# Patient Record
Sex: FEMALE | Race: WHITE | NOT HISPANIC OR LATINO
[De-identification: names, ages, dates, MRNs, and addresses within clinical notes are randomized per-mention and may not be internally consistent; named-entity substitution may affect disease eponyms.]

---

## 2019-01-11 PROBLEM — Z00.00 ENCOUNTER FOR PREVENTIVE HEALTH EXAMINATION: Status: ACTIVE | Noted: 2019-01-11

## 2019-01-14 ENCOUNTER — APPOINTMENT (OUTPATIENT)
Dept: ENDOCRINOLOGY | Facility: CLINIC | Age: 39
End: 2019-01-14
Payer: MEDICAID

## 2019-01-14 VITALS
WEIGHT: 185 LBS | DIASTOLIC BLOOD PRESSURE: 90 MMHG | SYSTOLIC BLOOD PRESSURE: 140 MMHG | BODY MASS INDEX: 29.73 KG/M2 | HEART RATE: 120 BPM | HEIGHT: 66 IN

## 2019-01-14 PROCEDURE — 99205 OFFICE O/P NEW HI 60 MIN: CPT

## 2019-01-14 RX ORDER — UBIDECARENONE 10 MG
10 CAPSULE ORAL
Refills: 0 | Status: ACTIVE | COMMUNITY

## 2019-01-14 RX ORDER — MAGNESIUM OXIDE 500 MG
500 TABLET ORAL
Refills: 0 | Status: ACTIVE | COMMUNITY

## 2019-01-14 NOTE — ASSESSMENT
[FreeTextEntry1] : 1) Hypothyroidism w/ hx of PTC:\par Appears clinically euthyroid at this time on 150 mcg of LT4 (not taking med appropriately, advised to wait at least 45 min prior to having brk). Reassess TFTs and need for medication titration at this time. Reviewed importance of compliance and to alert us if plans for pregnancy change in order to adjust dose and increase monitoring. Target TBD pending repeat TGB (was undetectable in 9/2018) and US of the neck. We will consider LT3 initiation.\par \par 1) Obesity, Morbid: Class I,. High risk of metabolic syndrome and future complications. Discussed options including meds, bariatric surgery and lifestyle modification. RB and alternatives discussed. Questions answered and she verbalized understanding. Refer to nutrition and start hypocaloric, hypocarb diet in addition to exercise regimen. Refer to  now. If no 5-7% weight loss observed on f/u, will consider med initiation.\par \par \par \par  [Carbohydrate Consistent Diet] : carbohydrate consistent diet [Long Term Vascular Complications] : long term vascular complications of diabetes [Importance of Diet and Exercise] : importance of diet and exercise to improve glycemic control, achieve weight loss and improve cardiovascular health [Levothyroxine] : The patient was instructed to take Levothyroxine on an empty stomach, separate from vitamins, and wait at least 30 minutes before eating

## 2019-01-14 NOTE — PHYSICAL EXAM
[Alert] : alert [No Acute Distress] : no acute distress [Well Nourished] : well nourished [Well Developed] : well developed [Normal Sclera/Conjunctiva] : normal sclera/conjunctiva [EOMI] : extra ocular movement intact [No Proptosis] : no proptosis [Normal Oropharynx] : the oropharynx was normal [No Neck Mass] : no neck mass was observed [No LAD] : no lymphadenopathy [Well Healed Scar] : well healed scar [No Respiratory Distress] : no respiratory distress [No Accessory Muscle Use] : no accessory muscle use [Clear to Auscultation] : lungs were clear to auscultation bilaterally [Normal Rate] : heart rate was normal  [Normal S1, S2] : normal S1 and S2 [Regular Rhythm] : with a regular rhythm [Pedal Pulses Normal] : the pedal pulses are present [No Edema] : there was no peripheral edema [Normal Bowel Sounds] : normal bowel sounds [Not Tender] : non-tender [Soft] : abdomen soft [Not Distended] : not distended [Post Cervical Nodes] : posterior cervical nodes [Anterior Cervical Nodes] : anterior cervical nodes [Axillary Nodes] : axillary nodes [Normal] : normal and non tender [No Spinal Tenderness] : no spinal tenderness [Spine Straight] : spine straight [No Stigmata of Cushings Syndrome] : no stigmata of cushings syndrome [Normal Gait] : normal gait [Normal Strength/Tone] : muscle strength and tone were normal [No Rash] : no rash [Normal Reflexes] : deep tendon reflexes were 2+ and symmetric [No Tremors] : no tremors [Oriented x3] : oriented to person, place, and time [Acanthosis Nigricans] : no acanthosis nigricans

## 2019-01-14 NOTE — HISTORY OF PRESENT ILLNESS
[FreeTextEntry1] : 37 y/o F\par Here for initial thyroid evaluation.\par Reports remote hx of hyperthyroidism, dx at age 10. Had PORRAS treatment back then with partial resolution of symptoms. \par Hyperthyroidism recurred in 2012, prompting re-evaluation. She reports having elective TT. Path revealed PTC and she had PORRAS treatment in 2013. All of this management was performed in the DR. \par Since, she reports compliance w/ LT4, was chronically on 137 mcg till 9/2018, when dose was increased to 150 mcg. She is taking it 15-30 min before breakfast and w/o other pills, only water. TSH continues above 8 as of 1/2019. fT4 is high normal at 1.6, Tt3 is low normal at 90. All other labs wnl aside from mildly increased TP and Albumin. ROS + for fatigue, body aches and difficulty with weight loss, which are chronic.\par She otherwise denies any f/c, CP, SOB, palpitations, tremors, depressed mood, anxiety, palpitations, n/v, stool/urinary abn, skin changes, heat/cold intolerance, HAs, breast/nipple changes, polyuria/polydipsia/nocturia or other complaints.\par

## 2019-01-14 NOTE — PAST MEDICAL HISTORY
[Menstruating] : The patient is menstruating [History of Hormone Replacement Treatment] : has a history of hormone replacement treatment [Regular Cycle Intervals] : have been regular

## 2019-05-07 ENCOUNTER — RX RENEWAL (OUTPATIENT)
Age: 39
End: 2019-05-07

## 2019-05-13 ENCOUNTER — APPOINTMENT (OUTPATIENT)
Dept: ENDOCRINOLOGY | Facility: CLINIC | Age: 39
End: 2019-05-13
Payer: MEDICAID

## 2019-05-13 VITALS
BODY MASS INDEX: 29.19 KG/M2 | HEART RATE: 68 BPM | DIASTOLIC BLOOD PRESSURE: 80 MMHG | SYSTOLIC BLOOD PRESSURE: 120 MMHG | HEIGHT: 67 IN | WEIGHT: 186 LBS

## 2019-05-13 PROCEDURE — 99215 OFFICE O/P EST HI 40 MIN: CPT

## 2019-05-13 NOTE — HISTORY OF PRESENT ILLNESS
[FreeTextEntry1] : 39 y/o F\par Here for initial thyroid evaluation.\par Reports remote hx of hyperthyroidism, dx at age 10. Had PORRAS treatment back then with partial resolution of symptoms. \par Hyperthyroidism recurred in 2012, prompting re-evaluation. She reports having elective TT. Path revealed PTC and she had PORRAS treatment in 2013. All of this management was performed in the DR. \par Since, she reports compliance w/ LT4, was chronically on 137 mcg till 9/2018, when dose was increased to 150 mcg. She is taking it 15-30 min before breakfast and w/o other pills, only water. TSH continues above 8 as of 1/2019. fT4 is high normal at 1.6, Tt3 is low normal at 90. All other labs wnl aside from mildly increased TP and Albumin. ROS + for fatigue, body aches and difficulty with weight loss, which are chronic.\par \par 5/2019: Here for /fu, generally feels well and endorses no acute complaints. No interval events since LV. Today comes to f/u labs. Notes ongoing fatigue and difficuly w/ weight loss but again admits to sedentary lifestyle and anxiety driven binge eating.\par She otherwise denies any f/c, CP, SOB, palpitations, tremors, depressed mood, anxiety, palpitations, n/v, stool/urinary abn, skin changes, heat/cold intolerance, HAs, breast/nipple changes, polyuria/polydipsia/nocturia or other complaints.\par

## 2019-05-13 NOTE — ASSESSMENT
[FreeTextEntry1] : 1) Hypothyroidism w/ hx of PTC:\par Appears clinically euthyroid at this time on 150 mcg of LT4 (not taking med appropriately, advised to wait at least 45 min prior to having brk). Reassess TFTs and need for medication titration at this time. Reviewed importance of compliance and to alert us if plans for pregnancy change in order to adjust dose and increase monitoring. Target TBD pending repeat TGB (was undetectable in 9/2018) and US of the neck. We will consider LT3 initiation.\par \par 1) Obesity, Morbid: Class I,. High risk of metabolic syndrome and future complications. Discussed options including meds, bariatric surgery and lifestyle modification. RB and alternatives discussed. Questions answered and she verbalized understanding. Refer to nutrition and start hypocaloric, hypocarb diet in addition to exercise regimen. As anxiety driven binge eating is apparent, we will start  wellbutrin 150 mg QD and titrate to response as tolerated. If no 5-7% weight loss observed on f/u, will consider metformin initiation.\par \par \par \par  [Carbohydrate Consistent Diet] : carbohydrate consistent diet [Long Term Vascular Complications] : long term vascular complications of diabetes [Importance of Diet and Exercise] : importance of diet and exercise to improve glycemic control, achieve weight loss and improve cardiovascular health [Levothyroxine] : The patient was instructed to take Levothyroxine on an empty stomach, separate from vitamins, and wait at least 30 minutes before eating [FreeTextEntry2] : 15 min independent obesity counseling provided

## 2019-05-13 NOTE — PHYSICAL EXAM
[Alert] : alert [No Acute Distress] : no acute distress [Well Nourished] : well nourished [Well Developed] : well developed [Normal Sclera/Conjunctiva] : normal sclera/conjunctiva [EOMI] : extra ocular movement intact [No Proptosis] : no proptosis [Normal Oropharynx] : the oropharynx was normal [No Neck Mass] : no neck mass was observed [No LAD] : no lymphadenopathy [Well Healed Scar] : well healed scar [No Respiratory Distress] : no respiratory distress [No Accessory Muscle Use] : no accessory muscle use [Clear to Auscultation] : lungs were clear to auscultation bilaterally [Normal Rate] : heart rate was normal  [Normal S1, S2] : normal S1 and S2 [Regular Rhythm] : with a regular rhythm [Pedal Pulses Normal] : the pedal pulses are present [No Edema] : there was no peripheral edema [Normal Bowel Sounds] : normal bowel sounds [Not Tender] : non-tender [Soft] : abdomen soft [Not Distended] : not distended [Post Cervical Nodes] : posterior cervical nodes [Anterior Cervical Nodes] : anterior cervical nodes [Axillary Nodes] : axillary nodes [Normal] : normal and non tender [No Spinal Tenderness] : no spinal tenderness [Spine Straight] : spine straight [No Stigmata of Cushings Syndrome] : no stigmata of cushings syndrome [Normal Gait] : normal gait [Normal Strength/Tone] : muscle strength and tone were normal [No Rash] : no rash [Acanthosis Nigricans] : no acanthosis nigricans [Normal Reflexes] : deep tendon reflexes were 2+ and symmetric [No Tremors] : no tremors [Oriented x3] : oriented to person, place, and time

## 2019-08-07 ENCOUNTER — RX RENEWAL (OUTPATIENT)
Age: 39
End: 2019-08-07

## 2019-08-26 ENCOUNTER — APPOINTMENT (OUTPATIENT)
Dept: ENDOCRINOLOGY | Facility: CLINIC | Age: 39
End: 2019-08-26
Payer: MEDICAID

## 2019-08-26 VITALS
HEART RATE: 71 BPM | HEIGHT: 67 IN | BODY MASS INDEX: 28.41 KG/M2 | DIASTOLIC BLOOD PRESSURE: 80 MMHG | SYSTOLIC BLOOD PRESSURE: 120 MMHG | WEIGHT: 181 LBS

## 2019-08-26 PROCEDURE — 99215 OFFICE O/P EST HI 40 MIN: CPT | Mod: 25

## 2019-08-26 PROCEDURE — G0447 BEHAVIOR COUNSEL OBESITY 15M: CPT

## 2019-08-26 NOTE — ASSESSMENT
[Carbohydrate Consistent Diet] : carbohydrate consistent diet [Long Term Vascular Complications] : long term vascular complications of diabetes [Importance of Diet and Exercise] : importance of diet and exercise to improve glycemic control, achieve weight loss and improve cardiovascular health [Levothyroxine] : The patient was instructed to take Levothyroxine on an empty stomach, separate from vitamins, and wait at least 30 minutes before eating [FreeTextEntry1] : 1) Hypothyroidism w/ hx of PTC:\par Appears clinically euthyroid at this time on 150 mcg of LT4 (not taking med appropriately, advised to wait at least 45 min prior to having brk). Reassess TFTs and need for medication titration at this time. Reviewed importance of compliance and to alert us if plans for pregnancy change in order to adjust dose and increase monitoring. Target TBD pending repeat TGB (was undetectable in 9/2018) and US of the neck. We will consider LT3 initiation.\par \par 1) Obesity, Morbid: Class I,. High risk of metabolic syndrome and future complications. Discussed options including meds, bariatric surgery and lifestyle modification. RB and alternatives discussed. Questions answered and she verbalized understanding. Refer to nutrition and start hypocaloric, hypocarb diet in addition to exercise regimen. As anxiety driven binge eating is apparent, we will start  wellbutrin 150 mg QD and titrate to response as tolerated. If no 5-7% weight loss observed on f/u, will proceed w/ metformin initiation.\par \par \par \par  [FreeTextEntry2] : 15 min independent obesity counseling provided

## 2019-08-26 NOTE — HISTORY OF PRESENT ILLNESS
[FreeTextEntry1] : 39 y/o F\par Here for initial thyroid evaluation.\par Reports remote hx of hyperthyroidism, dx at age 10. Had PORRAS treatment back then with partial resolution of symptoms. \par Hyperthyroidism recurred in 2012, prompting re-evaluation. She reports having elective TT. Path revealed PTC and she had PORRAS treatment in 2013. All of this management was performed in the DR. \par Since, she reports compliance w/ LT4, was chronically on 137 mcg till 9/2018, when dose was increased to 150 mcg. She is taking it 15-30 min before breakfast and w/o other pills, only water. TSH continues above 8 as of 1/2019. fT4 is high normal at 1.6, Tt3 is low normal at 90. All other labs wnl aside from mildly increased TP and Albumin. ROS + for fatigue, body aches and difficulty with weight loss, which are chronic.\par \par 8/2019: Here for /fu, generally feels well and endorses no acute complaints. No interval events since LV. Today comes to f/u labs. Notes resolved fatigue and has had more success w/ weight loss but again admits to sedentary lifestyle and anxiety driven binge eating. tolerated bupropion well.\par She otherwise denies any f/c, CP, SOB, palpitations, tremors, depressed mood, anxiety, palpitations, n/v, stool/urinary abn, skin changes, heat/cold intolerance, HAs, breast/nipple changes, polyuria/polydipsia/nocturia or other complaints.\par

## 2019-11-05 ENCOUNTER — RX RENEWAL (OUTPATIENT)
Age: 39
End: 2019-11-05

## 2020-02-03 ENCOUNTER — RX RENEWAL (OUTPATIENT)
Age: 40
End: 2020-02-03

## 2020-06-09 ENCOUNTER — RX RENEWAL (OUTPATIENT)
Age: 40
End: 2020-06-09

## 2020-06-14 ENCOUNTER — RX RENEWAL (OUTPATIENT)
Age: 40
End: 2020-06-14

## 2020-08-31 ENCOUNTER — APPOINTMENT (OUTPATIENT)
Dept: ENDOCRINOLOGY | Facility: CLINIC | Age: 40
End: 2020-08-31
Payer: MEDICAID

## 2020-08-31 VITALS
HEART RATE: 70 BPM | WEIGHT: 188 LBS | BODY MASS INDEX: 29.51 KG/M2 | DIASTOLIC BLOOD PRESSURE: 84 MMHG | HEIGHT: 67 IN | SYSTOLIC BLOOD PRESSURE: 118 MMHG

## 2020-08-31 PROCEDURE — 99215 OFFICE O/P EST HI 40 MIN: CPT

## 2020-08-31 RX ORDER — METFORMIN ER 500 MG 500 MG/1
500 TABLET ORAL DAILY
Qty: 90 | Refills: 3 | Status: COMPLETED | COMMUNITY
Start: 2019-08-26 | End: 2020-08-31

## 2020-08-31 NOTE — ASSESSMENT
[FreeTextEntry1] : 1) Hypothyroidism w/ hx of PTC:\par Appears clinically euthyroid at this time on 150 mcg of LT4 (not taking med appropriately, advised to wait at least 45 min prior to having brk). Reassess TFTs and need for medication titration at this time. Reviewed importance of compliance and to alert us if plans for pregnancy change in order to adjust dose and increase monitoring. Target TBD pending repeat TGB (was undetectable in 9/2018) and US of the neck. We will consider LT3 initiation.\par \par 1) Obesity, Morbid: Class I,. High risk of metabolic syndrome and future complications. Discussed options including meds, bariatric surgery and lifestyle modification. RB and alternatives discussed. Questions answered and she verbalized understanding. Refer to nutrition and start hypocaloric, hypocarb diet in addition to exercise regimen. As anxiety driven binge eating is apparent, we will start  wellbutrin 150 mg QD and titrate to response as tolerated. If no 5-7% weight loss observed on f/u, will proceed w/ metformin initiation.\par \par \par \par  [Carbohydrate Consistent Diet] : carbohydrate consistent diet [Long Term Vascular Complications] : long term vascular complications of diabetes [Importance of Diet and Exercise] : importance of diet and exercise to improve glycemic control, achieve weight loss and improve cardiovascular health [Levothyroxine] : The patient was instructed to take Levothyroxine on an empty stomach, separate from vitamins, and wait at least 30 minutes before eating [FreeTextEntry2] : 15 min independent obesity counseling provided

## 2020-08-31 NOTE — PHYSICAL EXAM
[Alert] : alert [No Acute Distress] : no acute distress [Well Nourished] : well nourished [Well Developed] : well developed [Normal Sclera/Conjunctiva] : normal sclera/conjunctiva [EOMI] : extra ocular movement intact [No Proptosis] : no proptosis [Normal Oropharynx] : the oropharynx was normal [Thyroid Not Enlarged] : the thyroid was not enlarged [No Thyroid Nodules] : no palpable thyroid nodules [No Respiratory Distress] : no respiratory distress [No Accessory Muscle Use] : no accessory muscle use [Clear to Auscultation] : lungs were clear to auscultation bilaterally [Normal S1, S2] : normal S1 and S2 [Normal Rate] : heart rate was normal [Regular Rhythm] : with a regular rhythm [No Edema] : no peripheral edema [Normal Bowel Sounds] : normal bowel sounds [Pedal Pulses Normal] : the pedal pulses are present [Not Distended] : not distended [Not Tender] : non-tender [Soft] : abdomen soft [Normal Anterior Cervical Nodes] : no anterior cervical lymphadenopathy [Normal Posterior Cervical Nodes] : no posterior cervical lymphadenopathy [No Spinal Tenderness] : no spinal tenderness [Spine Straight] : spine straight [No Stigmata of Cushings Syndrome] : no stigmata of Cushings Syndrome [Normal Gait] : normal gait [Normal Strength/Tone] : muscle strength and tone were normal [No Rash] : no rash [Acanthosis Nigricans] : no acanthosis nigricans [Normal Reflexes] : deep tendon reflexes were 2+ and symmetric [No Tremors] : no tremors [Oriented x3] : oriented to person, place, and time

## 2020-08-31 NOTE — HISTORY OF PRESENT ILLNESS
[FreeTextEntry1] : 40 y/o F\par Here for f/u thyroid evaluation.\par Reports remote hx of hyperthyroidism, dx at age 10. Had PORRAS treatment back then with partial resolution of symptoms. \par Hyperthyroidism recurred in 2012, prompting re-evaluation. She reports having elective TT. Path revealed PTC and she had PORRAS treatment in 2013. All of this management was performed in the DR. \par Since, she reports compliance w/ LT4, was chronically on 137 mcg till 9/2018, when dose was increased to 150 mcg. She is taking it 15-30 min before breakfast and w/o other pills, only water. TSH continues above 8 as of 1/2019. fT4 is high normal at 1.6, Tt3 is low normal at 90. All other labs wnl aside from mildly increased TP and Albumin. ROS + for fatigue, body aches and difficulty with weight loss, which are chronic.\par \par 8/2019: Here for /fu, generally feels well and endorses no acute complaints. No interval events since LV. Today comes to f/u labs. Notes resolved fatigue and has had more success w/ weight loss but again admits to sedentary lifestyle and anxiety driven binge eating. tolerated bupropion well.\par She otherwise denies any f/c, CP, SOB, palpitations, tremors, depressed mood, anxiety, palpitations, n/v, stool/urinary abn, skin changes, heat/cold intolerance, HAs, breast/nipple changes, polyuria/polydipsia/nocturia or other complaints.\par

## 2020-12-07 ENCOUNTER — APPOINTMENT (OUTPATIENT)
Dept: ENDOCRINOLOGY | Facility: CLINIC | Age: 40
End: 2020-12-07
Payer: MEDICAID

## 2020-12-07 VITALS
WEIGHT: 188 LBS | SYSTOLIC BLOOD PRESSURE: 120 MMHG | BODY MASS INDEX: 29.51 KG/M2 | DIASTOLIC BLOOD PRESSURE: 80 MMHG | HEART RATE: 71 BPM | HEIGHT: 67 IN

## 2020-12-07 PROCEDURE — 99072 ADDL SUPL MATRL&STAF TM PHE: CPT

## 2020-12-07 PROCEDURE — 99215 OFFICE O/P EST HI 40 MIN: CPT

## 2020-12-07 NOTE — ASSESSMENT
[Long Term Vascular Complications] : long term vascular complications of diabetes [Levothyroxine] : The patient was instructed to take Levothyroxine on an empty stomach, separate from vitamins, and wait at least 30 minutes before eating [FreeTextEntry1] : 1) Hypothyroidism w/ hx of PTC:\par Appears clinically euthyroid at this time on 150 mcg of LT4 (not taking med appropriately, advised to wait at least 45 min prior to having brk). Reassess TFTs and need for medication titration at this time. Reviewed importance of compliance and to alert us if plans for pregnancy change in order to adjust dose and increase monitoring. Target TBD pending repeat TGB (was undetectable in 9/2018) and US of the neck. We will consider LT3 initiation.\par reduce to 137 mcg as TGB remains undetectable as of 12/2020\par \par 1) Obesity, Morbid: Class I,. High risk of metabolic syndrome and future complications. Discussed options including meds, bariatric surgery and lifestyle modification. RB and alternatives discussed. Questions answered and she verbalized understanding. Refer to nutrition and start hypocaloric, hypocarb diet in addition to exercise regimen. As anxiety driven binge eating is apparent, we will start  wellbutrin 150 mg QD and titrate to response as tolerated. If no 5-7% weight loss observed on f/u, will proceed w/ metformin initiation. we will also start metformin for prediabetes status\par \par \par \par  [Carbohydrate Consistent Diet] : carbohydrate consistent diet [Importance of Diet and Exercise] : importance of diet and exercise to improve glycemic control, achieve weight loss and improve cardiovascular health [FreeTextEntry2] : 15 min independent obesity counseling provided

## 2020-12-07 NOTE — HISTORY OF PRESENT ILLNESS
[FreeTextEntry1] : 38 y/o F\par Here for f/u thyroid evaluation.\par Reports remote hx of hyperthyroidism, dx at age 10. Had PORRAS treatment back then with partial resolution of symptoms. \par Hyperthyroidism recurred in 2012, prompting re-evaluation. She reports having elective TT. Path revealed PTC and she had PORRAS treatment in 2013. All of this management was performed in the DR. \par Since, she reports compliance w/ LT4, was chronically on 137 mcg till 9/2018, when dose was increased to 150 mcg. She is taking it 15-30 min before breakfast and w/o other pills, only water. TSH continues above 8 as of 1/2019. fT4 is high normal at 1.6, Tt3 is low normal at 90. All other labs wnl aside from mildly increased TP and Albumin. ROS + for fatigue, body aches and difficulty with weight loss, which are chronic.\par \par 12/2020: Here for /fu, generally feels well and endorses no acute complaints. No interval events since LV. Today comes to f/u labs. Notes resolved fatigue and has had more success w/ weight loss but again admits to sedentary lifestyle and anxiety driven binge eating. tolerated bupropion well.\par She otherwise denies any f/c, CP, SOB, palpitations, tremors, depressed mood, anxiety, palpitations, n/v, stool/urinary abn, skin changes, heat/cold intolerance, HAs, breast/nipple changes, polyuria/polydipsia/nocturia or other complaints.\par

## 2021-03-16 ENCOUNTER — RX RENEWAL (OUTPATIENT)
Age: 41
End: 2021-03-16

## 2021-04-05 ENCOUNTER — APPOINTMENT (OUTPATIENT)
Dept: ENDOCRINOLOGY | Facility: CLINIC | Age: 41
End: 2021-04-05
Payer: MEDICAID

## 2021-04-05 VITALS
HEART RATE: 86 BPM | WEIGHT: 190 LBS | BODY MASS INDEX: 29.82 KG/M2 | SYSTOLIC BLOOD PRESSURE: 130 MMHG | DIASTOLIC BLOOD PRESSURE: 90 MMHG | HEIGHT: 67 IN

## 2021-04-05 PROCEDURE — 99214 OFFICE O/P EST MOD 30 MIN: CPT

## 2021-04-05 PROCEDURE — 99072 ADDL SUPL MATRL&STAF TM PHE: CPT

## 2021-04-05 RX ORDER — METFORMIN ER 750 MG 750 MG/1
750 TABLET ORAL DAILY
Qty: 90 | Refills: 0 | Status: COMPLETED | COMMUNITY
Start: 2020-12-07 | End: 2021-04-05

## 2021-04-05 NOTE — HISTORY OF PRESENT ILLNESS
[FreeTextEntry1] : 39 y/o F\par Here for f/u thyroid evaluation.\par Reports remote hx of hyperthyroidism, dx at age 10. Had PORRAS treatment back then with partial resolution of symptoms. \par Hyperthyroidism recurred in 2012, prompting re-evaluation. She reports having elective TT. Path revealed PTC and she had PORRAS treatment in 2013. All of this management was performed in the DR. \par Since, she reports compliance w/ LT4, was chronically on 137 mcg till 9/2018, when dose was increased to 150 mcg. She is taking it 15-30 min before breakfast and w/o other pills, only water. TSH continues above 8 as of 1/2019. fT4 is high normal at 1.6, Tt3 is low normal at 90. All other labs wnl aside from mildly increased TP and Albumin. ROS + for fatigue, body aches and difficulty with weight loss, which are chronic.\par \par 12/2020: Here for /fu, generally feels well and endorses no acute complaints. No interval events since LV. Today comes to f/u labs. Notes resolved fatigue and has had more success w/ weight loss but again admits to sedentary lifestyle and anxiety driven binge eating. tolerated bupropion well.\par She otherwise denies any f/c, CP, SOB, palpitations, tremors, depressed mood, anxiety, palpitations, n/v, stool/urinary abn, skin changes, heat/cold intolerance, HAs, breast/nipple changes, polyuria/polydipsia/nocturia or other complaints.\par

## 2021-04-05 NOTE — ASSESSMENT
[Carbohydrate Consistent Diet] : carbohydrate consistent diet [Long Term Vascular Complications] : long term vascular complications of diabetes [Importance of Diet and Exercise] : importance of diet and exercise to improve glycemic control, achieve weight loss and improve cardiovascular health [Levothyroxine] : The patient was instructed to take Levothyroxine on an empty stomach, separate from vitamins, and wait at least 30 minutes before eating [FreeTextEntry1] : 1) Hypothyroidism w/ hx of PTC:\par Appears clinically euthyroid at this time on 150 mcg of LT4 (not taking med appropriately, advised to wait at least 45 min prior to having brk). Reassess TFTs and need for medication titration at this time. Reviewed importance of compliance and to alert us if plans for pregnancy change in order to adjust dose and increase monitoring. Target TBD pending repeat TGB (was undetectable in 9/2018) and US of the neck. We will consider LT3 initiation.\par reduce to 137 mcg as TGB remains undetectable as of 12/2020\par \par 1) Obesity, Morbid: Class I,. High risk of metabolic syndrome and future complications. Discussed options including meds, bariatric surgery and lifestyle modification. RB and alternatives discussed. Questions answered and she verbalized understanding. Refer to nutrition and start hypocaloric, hypocarb diet in addition to exercise regimen. As anxiety driven binge eating is apparent, we will start  wellbutrin 150 mg QD and titrate to response as tolerated. If no 5-7% weight loss observed on f/u, will proceed w/ metformin initiation. we will also start metformin for prediabetes status\par \par \par \par  [FreeTextEntry2] : 15 min independent obesity counseling provided

## 2021-11-01 ENCOUNTER — APPOINTMENT (OUTPATIENT)
Dept: ENDOCRINOLOGY | Facility: CLINIC | Age: 41
End: 2021-11-01
Payer: MEDICAID

## 2021-11-01 VITALS
HEART RATE: 90 BPM | WEIGHT: 171 LBS | SYSTOLIC BLOOD PRESSURE: 130 MMHG | DIASTOLIC BLOOD PRESSURE: 80 MMHG | HEIGHT: 67 IN | BODY MASS INDEX: 26.84 KG/M2

## 2021-11-01 PROCEDURE — 99215 OFFICE O/P EST HI 40 MIN: CPT

## 2021-11-01 RX ORDER — BUPROPION HYDROCHLORIDE 300 MG/1
300 TABLET, EXTENDED RELEASE ORAL DAILY
Qty: 90 | Refills: 3 | Status: ACTIVE | COMMUNITY
Start: 2019-05-13 | End: 1900-01-01

## 2021-11-01 NOTE — HISTORY OF PRESENT ILLNESS
[FreeTextEntry1] : 42 y/o F\par Here for f/u thyroid evaluation.\par Reports remote hx of hyperthyroidism, dx at age 10. Had PORRAS treatment back then with partial resolution of symptoms. \par Hyperthyroidism recurred in 2012, prompting re-evaluation. She reports having elective TT. Path revealed PTC and she had PORRAS treatment in 2013. All of this management was performed in the [place]. \par Since, she reports compliance w/ LT4, was chronically on 137 mcg till 9/2018, when dose was increased to 150 mcg. She is taking it 15-30 min before breakfast and w/o other pills, only water. TSH continues above 8 as of 1/2019. fT4 is high normal at 1.6, Tt3 is low normal at 90. All other labs wnl aside from mildly increased TP and Albumin. ROS + for fatigue, body aches and difficulty with weight loss, which are chronic.\par \par 10/2021: Here for /fu, generally feels well and endorses no acute complaints. No interval events since LV. Today comes to f/u labs. Notes resolved fatigue and has had more success w/ weight loss but again admits to sedentary lifestyle and anxiety driven binge eating. tolerated bupropion well.\par She otherwise denies any f/c, CP, SOB, palpitations, tremors, depressed mood, anxiety, palpitations, n/v, stool/urinary abn, skin changes, heat/cold intolerance, HAs, breast/nipple changes, polyuria/polydipsia/nocturia or other complaints.\par

## 2021-11-01 NOTE — ASSESSMENT
[FreeTextEntry1] : 1) Hypothyroidism w/ hx of PTC:\par Appears clinically euthyroid at this time on 150 mcg of LT4 (not taking med appropriately, advised to wait at least 45 min prior to having brk). Reassess TFTs and need for medication titration at this time. Reviewed importance of compliance and to alert us if plans for pregnancy change in order to adjust dose and increase monitoring. Target TBD pending repeat TGB (was undetectable in 9/2018) and US of the neck. We will consider LT3 initiation.\par reduce to 137 mcg as TGB remains undetectable as of 12/2020\par \par 1) Obesity, Morbid: Class I,. High risk of metabolic syndrome and future complications. Discussed options including meds, bariatric surgery and lifestyle modification. RB and alternatives discussed. Questions answered and she verbalized understanding. Refer to nutrition and start hypocaloric, hypocarb diet in addition to exercise regimen. As anxiety driven binge eating is apparent, we will start  wellbutrin 150 mg QD and titrate to response as tolerated. If no 5-7% weight loss observed on f/u, will proceed w/ metformin initiation. we will also start metformin for prediabetes status\par \par \par \par  [Carbohydrate Consistent Diet] : carbohydrate consistent diet [Long Term Vascular Complications] : long term vascular complications of diabetes [Importance of Diet and Exercise] : importance of diet and exercise to improve glycemic control, achieve weight loss and improve cardiovascular health [Levothyroxine] : The patient was instructed to take Levothyroxine on an empty stomach, separate from vitamins, and wait at least 30 minutes before eating [FreeTextEntry2] : 15 min independent obesity counseling provided

## 2021-11-22 NOTE — PHYSICAL EXAM
[Alert] : alert [No Acute Distress] : no acute distress [Well Nourished] : well nourished [Well Developed] : well developed [Normal Sclera/Conjunctiva] : normal sclera/conjunctiva [EOMI] : extra ocular movement intact [No Proptosis] : no proptosis [No Neck Mass] : no neck mass was observed [Normal Oropharynx] : the oropharynx was normal Calcipotriene Pregnancy And Lactation Text: This medication has not been proven safe during pregnancy. It is unknown if this medication is excreted in breast milk. [No LAD] : no lymphadenopathy [No Respiratory Distress] : no respiratory distress [Well Healed Scar] : well healed scar [No Accessory Muscle Use] : no accessory muscle use [Clear to Auscultation] : lungs were clear to auscultation bilaterally [Normal Rate] : heart rate was normal  [Normal S1, S2] : normal S1 and S2 [Regular Rhythm] : with a regular rhythm [Pedal Pulses Normal] : the pedal pulses are present [No Edema] : there was no peripheral edema [Normal Bowel Sounds] : normal bowel sounds [Not Tender] : non-tender [Soft] : abdomen soft [Not Distended] : not distended [Anterior Cervical Nodes] : anterior cervical nodes [Post Cervical Nodes] : posterior cervical nodes [Axillary Nodes] : axillary nodes [Normal] : normal and non tender [No Spinal Tenderness] : no spinal tenderness [Spine Straight] : spine straight [No Stigmata of Cushings Syndrome] : no stigmata of cushings syndrome [Normal Gait] : normal gait [Normal Strength/Tone] : muscle strength and tone were normal [No Rash] : no rash [Normal Reflexes] : deep tendon reflexes were 2+ and symmetric [No Tremors] : no tremors [Oriented x3] : oriented to person, place, and time [Acanthosis Nigricans] : no acanthosis nigricans

## 2022-01-14 NOTE — PHYSICAL EXAM
[Alert] : alert [Well Nourished] : well nourished [No Acute Distress] : no acute distress [Well Developed] : well developed [Normal Sclera/Conjunctiva] : normal sclera/conjunctiva [EOMI] : extra ocular movement intact [No Proptosis] : no proptosis [Normal Oropharynx] : the oropharynx was normal [Thyroid Not Enlarged] : the thyroid was not enlarged [No Thyroid Nodules] : no palpable thyroid nodules [No Respiratory Distress] : no respiratory distress [No Accessory Muscle Use] : no accessory muscle use [Clear to Auscultation] : lungs were clear to auscultation bilaterally [Normal S1, S2] : normal S1 and S2 [Normal Rate] : heart rate was normal [Regular Rhythm] : with a regular rhythm [No Edema] : no peripheral edema [Pedal Pulses Normal] : the pedal pulses are present [Normal Bowel Sounds] : normal bowel sounds [Not Tender] : non-tender [Not Distended] : not distended [Soft] : abdomen soft [Normal Anterior Cervical Nodes] : no anterior cervical lymphadenopathy [No Spinal Tenderness] : no spinal tenderness [Spine Straight] : spine straight [No Stigmata of Cushings Syndrome] : no stigmata of Cushings Syndrome [Normal Gait] : normal gait [Normal Strength/Tone] : muscle strength and tone were normal [No Rash] : no rash [Normal Reflexes] : deep tendon reflexes were 2+ and symmetric [No Tremors] : no tremors [Oriented x3] : oriented to person, place, and time [Acanthosis Nigricans] : no acanthosis nigricans Mild kyphosis/ROM intact/strength intact/kyphosis

## 2022-10-11 ENCOUNTER — APPOINTMENT (OUTPATIENT)
Dept: ENDOCRINOLOGY | Facility: CLINIC | Age: 42
End: 2022-10-11

## 2022-10-11 VITALS
DIASTOLIC BLOOD PRESSURE: 87 MMHG | BODY MASS INDEX: 29.13 KG/M2 | SYSTOLIC BLOOD PRESSURE: 125 MMHG | WEIGHT: 186 LBS | HEART RATE: 91 BPM

## 2022-10-11 DIAGNOSIS — E66.3 OVERWEIGHT: ICD-10-CM

## 2022-10-11 PROCEDURE — 36415 COLL VENOUS BLD VENIPUNCTURE: CPT

## 2022-10-11 PROCEDURE — 99215 OFFICE O/P EST HI 40 MIN: CPT | Mod: 25

## 2022-10-14 NOTE — ASSESSMENT
[FreeTextEntry1] : 1) Hypothyroidism w/ hx of PTC:\par Appears clinically euthyroid at this time on 137 mcg of LT4 (not taking med appropriately, advised to wait at least 45 min prior to having brk). Reassess TFTs and need for medication titration at this time. Reviewed importance of compliance and to alert us if plans for pregnancy change in order to adjust dose and increase monitoring. Target TBD pending repeat TGB (was undetectable in 9/2018) and US of the neck. We will consider LT3 initiation.\par reduce to 137 mcg as TGB remains undetectable as of 12/2020\par \par 1) Obesity, Morbid: Class I,. High risk of metabolic syndrome and future complications. Discussed options including meds, bariatric surgery and lifestyle modification. RB and alternatives discussed. Questions answered and she verbalized understanding. Refer to nutrition and start hypocaloric, hypocarb diet in addition to exercise regimen. As anxiety driven binge eating is apparent, we will cont wellbutrin 150 mg QD and titrate to response as tolerated. If no 5-7% weight loss observed on f/u, will proceed w/ metformin initiation. we will also start metformin for prediabetes status\par \par \par \par  [Carbohydrate Consistent Diet] : carbohydrate consistent diet [Long Term Vascular Complications] : long term vascular complications of diabetes [Importance of Diet and Exercise] : importance of diet and exercise to improve glycemic control, achieve weight loss and improve cardiovascular health [Levothyroxine] : The patient was instructed to take Levothyroxine on an empty stomach, separate from vitamins, and wait at least 30 minutes before eating [FreeTextEntry2] : 15 min independent obesity counseling provided

## 2022-10-14 NOTE — HISTORY OF PRESENT ILLNESS
[FreeTextEntry1] : 40 y/o F\par Here for f/u thyroid evaluation.\par Reports remote hx of hyperthyroidism, dx at age 10. Had PORRAS treatment back then with partial resolution of symptoms. \par Hyperthyroidism recurred in 2012, prompting re-evaluation. She reports having elective TT. Path revealed PTC and she had PORRAS treatment in 2013. All of this management was performed in the [place]. \par Since, she reports compliance w/ LT4, was chronically on 137 mcg till 9/2018, when dose was increased to 150 mcg. She is taking it 15-30 min before breakfast and w/o other pills, only water. TSH continues above 8 as of 1/2019. fT4 is high normal at 1.6, Tt3 is low normal at 90. All other labs wnl aside from mildly increased TP and Albumin. ROS + for fatigue, body aches and difficulty with weight loss, which are chronic.\par \par 10/2022: Here for /fu, generally feels well and endorses no acute complaints. No interval events since LV. Today comes to f/u labs. Notes resolved fatigue and has had more success w/ weight loss but again admits to sedentary lifestyle and anxiety driven binge eating. tolerated bupropion well.\par She otherwise denies any f/c, CP, SOB, palpitations, tremors, depressed mood, anxiety, palpitations, n/v, stool/urinary abn, skin changes, heat/cold intolerance, HAs, breast/nipple changes, polyuria/polydipsia/nocturia or other complaints.\par

## 2022-10-20 LAB
ALBUMIN SERPL ELPH-MCNC: 4.2 G/DL
ALP BLD-CCNC: 91 U/L
ALT SERPL-CCNC: 13 U/L
ANION GAP SERPL CALC-SCNC: 11 MMOL/L
AST SERPL-CCNC: 12 U/L
BILIRUB SERPL-MCNC: <0.2 MG/DL
BUN SERPL-MCNC: 11 MG/DL
CALCIUM SERPL-MCNC: 9 MG/DL
CHLORIDE SERPL-SCNC: 102 MMOL/L
CO2 SERPL-SCNC: 24 MMOL/L
CREAT SERPL-MCNC: 0.83 MG/DL
EGFR: 90 ML/MIN/1.73M2
ESTIMATED AVERAGE GLUCOSE: 120 MG/DL
GLUCOSE SERPL-MCNC: 94 MG/DL
HBA1C MFR BLD HPLC: 5.8 %
POTASSIUM SERPL-SCNC: 3.9 MMOL/L
PROT SERPL-MCNC: 6.9 G/DL
SODIUM SERPL-SCNC: 138 MMOL/L
T3 SERPL-MCNC: 119 NG/DL
T4 FREE SERPL-MCNC: 1.6 NG/DL
THYROGLOB AB SERPL-ACNC: <20 IU/ML
THYROGLOB SERPL-MCNC: <0.2 NG/ML
TSH SERPL-ACNC: 4.19 UIU/ML

## 2022-10-22 ENCOUNTER — OUTPATIENT (OUTPATIENT)
Dept: OUTPATIENT SERVICES | Facility: HOSPITAL | Age: 42
LOS: 1 days | End: 2022-10-22
Payer: MEDICAID

## 2022-10-22 ENCOUNTER — APPOINTMENT (OUTPATIENT)
Dept: ULTRASOUND IMAGING | Facility: HOSPITAL | Age: 42
End: 2022-10-22

## 2022-10-22 PROCEDURE — 76536 US EXAM OF HEAD AND NECK: CPT | Mod: 26

## 2022-10-22 PROCEDURE — 76536 US EXAM OF HEAD AND NECK: CPT

## 2023-01-11 ENCOUNTER — APPOINTMENT (OUTPATIENT)
Dept: ENDOCRINOLOGY | Facility: CLINIC | Age: 43
End: 2023-01-11

## 2023-06-29 LAB
ALBUMIN SERPL ELPH-MCNC: 4 G/DL
ALP BLD-CCNC: 109 U/L
ALT SERPL-CCNC: 15 U/L
ANION GAP SERPL CALC-SCNC: 12 MMOL/L
AST SERPL-CCNC: 11 U/L
BILIRUB SERPL-MCNC: <0.2 MG/DL
BUN SERPL-MCNC: 10 MG/DL
CALCIUM SERPL-MCNC: 9.3 MG/DL
CHLORIDE SERPL-SCNC: 107 MMOL/L
CO2 SERPL-SCNC: 22 MMOL/L
CREAT SERPL-MCNC: 0.68 MG/DL
EGFR: 110 ML/MIN/1.73M2
ESTIMATED AVERAGE GLUCOSE: 123 MG/DL
GLUCOSE SERPL-MCNC: 95 MG/DL
HBA1C MFR BLD HPLC: 5.9 %
POTASSIUM SERPL-SCNC: 4.7 MMOL/L
PROT SERPL-MCNC: 7.2 G/DL
SODIUM SERPL-SCNC: 141 MMOL/L
T3 SERPL-MCNC: 130 NG/DL
T4 FREE SERPL-MCNC: 1.7 NG/DL
THYROGLOB AB SERPL-ACNC: <20 IU/ML
THYROGLOB SERPL-MCNC: <0.2 NG/ML
TSH SERPL-ACNC: 1.69 UIU/ML

## 2023-06-30 ENCOUNTER — APPOINTMENT (OUTPATIENT)
Dept: ENDOCRINOLOGY | Facility: CLINIC | Age: 43
End: 2023-06-30
Payer: MEDICAID

## 2023-06-30 VITALS
SYSTOLIC BLOOD PRESSURE: 133 MMHG | DIASTOLIC BLOOD PRESSURE: 86 MMHG | WEIGHT: 187 LBS | HEIGHT: 67 IN | HEART RATE: 80 BPM | BODY MASS INDEX: 29.35 KG/M2

## 2023-06-30 DIAGNOSIS — C73 MALIGNANT NEOPLASM OF THYROID GLAND: ICD-10-CM

## 2023-06-30 DIAGNOSIS — E03.9 HYPOTHYROIDISM, UNSPECIFIED: ICD-10-CM

## 2023-06-30 DIAGNOSIS — R73.03 PREDIABETES.: ICD-10-CM

## 2023-06-30 PROCEDURE — 99214 OFFICE O/P EST MOD 30 MIN: CPT

## 2023-06-30 RX ORDER — LEVOTHYROXINE SODIUM 0.14 MG/1
137 TABLET ORAL
Qty: 90 | Refills: 3 | Status: ACTIVE | COMMUNITY
Start: 2019-05-07 | End: 1900-01-01

## 2023-06-30 NOTE — HISTORY OF PRESENT ILLNESS
[FreeTextEntry1] : 41 y/o F\par Here for f/u thyroid evaluation.\par Reports remote hx of hyperthyroidism, dx at age 10. Had PORRAS treatment back then with partial resolution of symptoms. \par Hyperthyroidism recurred in 2012, prompting re-evaluation. She reports having elective TT. Path revealed PTC and she had PORRAS treatment in 2013. All of this management was performed in the DR. \par Since, she reports compliance w/ LT4, was chronically on 137 mcg till 9/2018, when dose was increased to 150 mcg. She is taking it 15-30 min before breakfast and w/o other pills, only water. TSH continues above 8 as of 1/2019. fT4 is high normal at 1.6, Tt3 is low normal at 90. All other labs wnl aside from mildly increased TP and Albumin. ROS + for fatigue, body aches and difficulty with weight loss, which are chronic.\par \par 6/2023: Here for /fu, generally feels well and endorses no acute complaints. No interval events since LV. Today comes to f/u labs. Notes resolved fatigue and has had more success w/ weight loss but again admits to sedentary lifestyle and anxiety driven binge eating. tolerated bupropion well.\par She otherwise denies any f/c, CP, SOB, palpitations, tremors, depressed mood, anxiety, palpitations, n/v, stool/urinary abn, skin changes, heat/cold intolerance, HAs, breast/nipple changes, polyuria/polydipsia/nocturia or other complaints.\par

## 2024-08-05 ENCOUNTER — RX RENEWAL (OUTPATIENT)
Age: 44
End: 2024-08-05

## 2024-11-07 DIAGNOSIS — E03.9 HYPOTHYROIDISM, UNSPECIFIED: ICD-10-CM

## 2024-11-07 DIAGNOSIS — R73.03 PREDIABETES.: ICD-10-CM

## 2024-11-07 DIAGNOSIS — C73 MALIGNANT NEOPLASM OF THYROID GLAND: ICD-10-CM

## 2024-12-05 ENCOUNTER — APPOINTMENT (OUTPATIENT)
Dept: INTERNAL MEDICINE | Facility: CLINIC | Age: 44
End: 2024-12-05
Payer: COMMERCIAL

## 2024-12-05 ENCOUNTER — NON-APPOINTMENT (OUTPATIENT)
Age: 44
End: 2024-12-05

## 2024-12-05 VITALS
HEART RATE: 86 BPM | WEIGHT: 185 LBS | DIASTOLIC BLOOD PRESSURE: 87 MMHG | SYSTOLIC BLOOD PRESSURE: 154 MMHG | BODY MASS INDEX: 28.98 KG/M2

## 2024-12-05 VITALS — DIASTOLIC BLOOD PRESSURE: 90 MMHG | OXYGEN SATURATION: 96 % | SYSTOLIC BLOOD PRESSURE: 145 MMHG

## 2024-12-05 DIAGNOSIS — Z11.3 ENCOUNTER FOR SCREENING FOR INFECTIONS WITH A PREDOMINANTLY SEXUAL MODE OF TRANSMISSION: ICD-10-CM

## 2024-12-05 DIAGNOSIS — J45.901 UNSPECIFIED ASTHMA WITH (ACUTE) EXACERBATION: ICD-10-CM

## 2024-12-05 DIAGNOSIS — R03.0 ELEVATED BLOOD-PRESSURE READING, W/OUT DIAGNOSIS OF HYPERTENSION: ICD-10-CM

## 2024-12-05 DIAGNOSIS — J45.20 MILD INTERMITTENT ASTHMA, UNCOMPLICATED: ICD-10-CM

## 2024-12-05 PROCEDURE — 99214 OFFICE O/P EST MOD 30 MIN: CPT | Mod: 25

## 2024-12-05 PROCEDURE — 93000 ELECTROCARDIOGRAM COMPLETE: CPT

## 2024-12-05 PROCEDURE — 99386 PREV VISIT NEW AGE 40-64: CPT | Mod: 25

## 2024-12-05 RX ORDER — ALBUTEROL SULFATE 90 UG/1
108 (90 BASE) INHALANT RESPIRATORY (INHALATION)
Qty: 1 | Refills: 2 | Status: ACTIVE | COMMUNITY
Start: 2024-12-05 | End: 1900-01-01

## 2024-12-05 RX ORDER — MONTELUKAST 10 MG/1
10 TABLET, FILM COATED ORAL
Qty: 90 | Refills: 3 | Status: ACTIVE | COMMUNITY
Start: 2024-12-05 | End: 1900-01-01

## 2024-12-05 RX ORDER — BUDESONIDE AND FORMOTEROL FUMARATE DIHYDRATE 80; 4.5 UG/1; UG/1
80-4.5 AEROSOL RESPIRATORY (INHALATION) TWICE DAILY
Qty: 1 | Refills: 2 | Status: ACTIVE | COMMUNITY
Start: 2024-12-05 | End: 1900-01-01

## 2024-12-05 RX ORDER — PREDNISONE 20 MG/1
20 TABLET ORAL
Qty: 10 | Refills: 0 | Status: ACTIVE | COMMUNITY
Start: 2024-12-05 | End: 1900-01-01

## 2024-12-07 ENCOUNTER — TRANSCRIPTION ENCOUNTER (OUTPATIENT)
Age: 44
End: 2024-12-07

## 2024-12-09 ENCOUNTER — TRANSCRIPTION ENCOUNTER (OUTPATIENT)
Age: 44
End: 2024-12-09

## 2024-12-10 ENCOUNTER — TRANSCRIPTION ENCOUNTER (OUTPATIENT)
Age: 44
End: 2024-12-10

## 2024-12-11 ENCOUNTER — TRANSCRIPTION ENCOUNTER (OUTPATIENT)
Age: 44
End: 2024-12-11

## 2024-12-11 ENCOUNTER — NON-APPOINTMENT (OUTPATIENT)
Age: 44
End: 2024-12-11

## 2024-12-11 LAB
BASOPHILS # BLD AUTO: 0.09 K/UL
BASOPHILS NFR BLD AUTO: 0.9 %
C TRACH RRNA SPEC QL NAA+PROBE: NOT DETECTED
CHOLEST SERPL-MCNC: 171 MG/DL
CREAT SPEC-SCNC: 264 MG/DL
EOSINOPHIL # BLD AUTO: 0.78 K/UL
EOSINOPHIL NFR BLD AUTO: 8.1 %
HCT VFR BLD CALC: 42.6 %
HCV AB SER QL: NONREACTIVE
HCV S/CO RATIO: 0.09 S/CO
HDLC SERPL-MCNC: 60 MG/DL
HGB BLD-MCNC: 13.4 G/DL
IMM GRANULOCYTES NFR BLD AUTO: 0.2 %
LDLC SERPL CALC-MCNC: 93 MG/DL
LYMPHOCYTES # BLD AUTO: 2.95 K/UL
LYMPHOCYTES NFR BLD AUTO: 30.6 %
MAN DIFF?: NORMAL
MCHC RBC-ENTMCNC: 29.8 PG
MCHC RBC-ENTMCNC: 31.5 G/DL
MCV RBC AUTO: 94.9 FL
MICROALBUMIN 24H UR DL<=1MG/L-MCNC: 2.8 MG/DL
MICROALBUMIN/CREAT 24H UR-RTO: 11 MG/G
MONOCYTES # BLD AUTO: 0.38 K/UL
MONOCYTES NFR BLD AUTO: 3.9 %
N GONORRHOEA RRNA SPEC QL NAA+PROBE: NOT DETECTED
NEUTROPHILS # BLD AUTO: 5.41 K/UL
NEUTROPHILS NFR BLD AUTO: 56.3 %
NONHDLC SERPL-MCNC: 111 MG/DL
PLATELET # BLD AUTO: 333 K/UL
RBC # BLD: 4.49 M/UL
RBC # FLD: 13.4 %
SOURCE AMPLIFICATION: NORMAL
T PALLIDUM AB SER QL IA: NEGATIVE
TRIGL SERPL-MCNC: 100 MG/DL
WBC # FLD AUTO: 9.63 K/UL

## 2024-12-30 ENCOUNTER — APPOINTMENT (OUTPATIENT)
Dept: INTERNAL MEDICINE | Facility: CLINIC | Age: 44
End: 2024-12-30
Payer: COMMERCIAL

## 2024-12-30 VITALS
WEIGHT: 182 LBS | HEIGHT: 67 IN | SYSTOLIC BLOOD PRESSURE: 129 MMHG | HEART RATE: 91 BPM | DIASTOLIC BLOOD PRESSURE: 91 MMHG | BODY MASS INDEX: 28.56 KG/M2 | OXYGEN SATURATION: 96 %

## 2024-12-30 DIAGNOSIS — E03.9 HYPOTHYROIDISM, UNSPECIFIED: ICD-10-CM

## 2024-12-30 DIAGNOSIS — R73.03 PREDIABETES.: ICD-10-CM

## 2024-12-30 PROCEDURE — 99213 OFFICE O/P EST LOW 20 MIN: CPT | Mod: 25

## 2024-12-31 ENCOUNTER — TRANSCRIPTION ENCOUNTER (OUTPATIENT)
Age: 44
End: 2024-12-31

## 2024-12-31 LAB
ANION GAP SERPL CALC-SCNC: 10 MMOL/L
BUN SERPL-MCNC: 18 MG/DL
CALCIUM SERPL-MCNC: 9 MG/DL
CHLORIDE SERPL-SCNC: 103 MMOL/L
CO2 SERPL-SCNC: 23 MMOL/L
CREAT SERPL-MCNC: 0.9 MG/DL
EGFR: 80 ML/MIN/1.73M2
ESTIMATED AVERAGE GLUCOSE: 123 MG/DL
GLUCOSE SERPL-MCNC: 95 MG/DL
HBA1C MFR BLD HPLC: 5.9 %
POTASSIUM SERPL-SCNC: 4.4 MMOL/L
SODIUM SERPL-SCNC: 135 MMOL/L
T3 SERPL-MCNC: 129 NG/DL
THYROGLOB AB SERPL-ACNC: 16 IU/ML
THYROGLOB SERPL-MCNC: <0.1 NG/ML
TSH SERPL-ACNC: 1.53 UIU/ML

## 2025-01-31 ENCOUNTER — APPOINTMENT (OUTPATIENT)
Dept: ENDOCRINOLOGY | Facility: CLINIC | Age: 45
End: 2025-01-31

## 2025-02-28 ENCOUNTER — RX RENEWAL (OUTPATIENT)
Age: 45
End: 2025-02-28

## 2025-03-10 ENCOUNTER — RX RENEWAL (OUTPATIENT)
Age: 45
End: 2025-03-10

## 2025-03-10 RX ORDER — BUDESONIDE AND FORMOTEROL FUMARATE DIHYDRATE 80; 4.5 UG/1; UG/1
80-4.5 AEROSOL RESPIRATORY (INHALATION) TWICE DAILY
Qty: 1 | Refills: 2 | Status: ACTIVE | COMMUNITY
Start: 2025-03-10 | End: 1900-01-01

## 2025-03-25 ENCOUNTER — APPOINTMENT (OUTPATIENT)
Dept: ENDOCRINOLOGY | Facility: CLINIC | Age: 45
End: 2025-03-25
Payer: COMMERCIAL

## 2025-03-25 VITALS
HEART RATE: 104 BPM | HEIGHT: 67 IN | SYSTOLIC BLOOD PRESSURE: 143 MMHG | WEIGHT: 187 LBS | BODY MASS INDEX: 29.35 KG/M2 | DIASTOLIC BLOOD PRESSURE: 91 MMHG

## 2025-03-25 DIAGNOSIS — E03.9 HYPOTHYROIDISM, UNSPECIFIED: ICD-10-CM

## 2025-03-25 DIAGNOSIS — R53.82 CHRONIC FATIGUE, UNSPECIFIED: ICD-10-CM

## 2025-03-25 DIAGNOSIS — C73 MALIGNANT NEOPLASM OF THYROID GLAND: ICD-10-CM

## 2025-03-25 DIAGNOSIS — E66.3 OVERWEIGHT: ICD-10-CM

## 2025-03-25 DIAGNOSIS — R73.03 PREDIABETES.: ICD-10-CM

## 2025-03-25 PROCEDURE — 99215 OFFICE O/P EST HI 40 MIN: CPT | Mod: 25

## 2025-03-28 LAB
25(OH)D3 SERPL-MCNC: 60.2 NG/ML
ALBUMIN SERPL ELPH-MCNC: 4.3 G/DL
ALP BLD-CCNC: 112 U/L
ALT SERPL-CCNC: 13 U/L
ANION GAP SERPL CALC-SCNC: 11 MMOL/L
AST SERPL-CCNC: 9 U/L
BILIRUB SERPL-MCNC: <0.2 MG/DL
BUN SERPL-MCNC: 11 MG/DL
CALCIUM SERPL-MCNC: 8.9 MG/DL
CHLORIDE SERPL-SCNC: 104 MMOL/L
CO2 SERPL-SCNC: 23 MMOL/L
CREAT SERPL-MCNC: 0.71 MG/DL
EGFRCR SERPLBLD CKD-EPI 2021: 106 ML/MIN/1.73M2
ESTIMATED AVERAGE GLUCOSE: 120 MG/DL
FERRITIN SERPL-MCNC: 83 NG/ML
GLUCOSE SERPL-MCNC: 101 MG/DL
HBA1C MFR BLD HPLC: 5.8 %
HCT VFR BLD CALC: 43.6 %
HGB BLD-MCNC: 13.3 G/DL
IRON SATN MFR SERPL: 27 %
IRON SERPL-MCNC: 93 UG/DL
MCHC RBC-ENTMCNC: 29.8 PG
MCHC RBC-ENTMCNC: 30.5 G/DL
MCV RBC AUTO: 97.8 FL
PLATELET # BLD AUTO: 302 K/UL
POTASSIUM SERPL-SCNC: 4.5 MMOL/L
PROT SERPL-MCNC: 7.2 G/DL
RBC # BLD: 4.46 M/UL
RBC # FLD: 13.2 %
SODIUM SERPL-SCNC: 138 MMOL/L
T3 SERPL-MCNC: 139 NG/DL
T4 FREE SERPL-MCNC: 1.7 NG/DL
THYROGLOB AB SERPL-ACNC: <15 IU/ML
THYROGLOB SERPL-MCNC: <0.1 NG/ML
TIBC SERPL-MCNC: 343 UG/DL
TSH SERPL-ACNC: 0.98 UIU/ML
UIBC SERPL-MCNC: 250 UG/DL
WBC # FLD AUTO: 7.18 K/UL

## 2025-08-29 DIAGNOSIS — E03.9 HYPOTHYROIDISM, UNSPECIFIED: ICD-10-CM

## 2025-09-02 LAB
ALBUMIN SERPL ELPH-MCNC: 4.4 G/DL
ALP BLD-CCNC: 89 U/L
ALT SERPL-CCNC: 22 U/L
ANION GAP SERPL CALC-SCNC: 13 MMOL/L
AST SERPL-CCNC: 14 U/L
BILIRUB SERPL-MCNC: 0.2 MG/DL
BUN SERPL-MCNC: 12 MG/DL
CALCIUM SERPL-MCNC: 10 MG/DL
CHLORIDE SERPL-SCNC: 102 MMOL/L
CO2 SERPL-SCNC: 22 MMOL/L
CREAT SERPL-MCNC: 0.72 MG/DL
EGFRCR SERPLBLD CKD-EPI 2021: 104 ML/MIN/1.73M2
ESTIMATED AVERAGE GLUCOSE: 117 MG/DL
GLUCOSE SERPL-MCNC: 95 MG/DL
HBA1C MFR BLD HPLC: 5.7 %
POTASSIUM SERPL-SCNC: 5 MMOL/L
PROT SERPL-MCNC: 7.4 G/DL
SODIUM SERPL-SCNC: 137 MMOL/L
T4 FREE SERPL-MCNC: 2 NG/DL
THYROGLOB AB SERPL-ACNC: 17.3 IU/ML
THYROGLOB SERPL-MCNC: <0.1 NG/ML
TSH SERPL-ACNC: 1.87 UIU/ML

## 2025-09-11 ENCOUNTER — APPOINTMENT (OUTPATIENT)
Dept: ENDOCRINOLOGY | Facility: CLINIC | Age: 45
End: 2025-09-11
Payer: COMMERCIAL

## 2025-09-11 VITALS
SYSTOLIC BLOOD PRESSURE: 116 MMHG | WEIGHT: 174 LBS | HEART RATE: 81 BPM | DIASTOLIC BLOOD PRESSURE: 76 MMHG | BODY MASS INDEX: 27.25 KG/M2

## 2025-09-11 DIAGNOSIS — C73 MALIGNANT NEOPLASM OF THYROID GLAND: ICD-10-CM

## 2025-09-11 DIAGNOSIS — E03.9 HYPOTHYROIDISM, UNSPECIFIED: ICD-10-CM

## 2025-09-11 DIAGNOSIS — R73.03 PREDIABETES.: ICD-10-CM

## 2025-09-11 DIAGNOSIS — E66.3 OVERWEIGHT: ICD-10-CM

## 2025-09-11 PROCEDURE — 99214 OFFICE O/P EST MOD 30 MIN: CPT
